# Patient Record
Sex: MALE | Race: WHITE | NOT HISPANIC OR LATINO | Employment: STUDENT | URBAN - METROPOLITAN AREA
[De-identification: names, ages, dates, MRNs, and addresses within clinical notes are randomized per-mention and may not be internally consistent; named-entity substitution may affect disease eponyms.]

---
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
## 2022-03-16 ENCOUNTER — OFFICE VISIT (OUTPATIENT)
Dept: FAMILY MEDICINE CLINIC | Facility: CLINIC | Age: 22
End: 2022-03-16

## 2022-03-16 VITALS
HEIGHT: 69 IN | RESPIRATION RATE: 14 BRPM | OXYGEN SATURATION: 100 % | HEART RATE: 99 BPM | BODY MASS INDEX: 17.57 KG/M2 | TEMPERATURE: 98.7 F

## 2022-03-16 DIAGNOSIS — R11.10 VOMITING, INTRACTABILITY OF VOMITING NOT SPECIFIED, PRESENCE OF NAUSEA NOT SPECIFIED, UNSPECIFIED VOMITING TYPE: ICD-10-CM

## 2022-03-16 DIAGNOSIS — R50.9 FEVER, UNSPECIFIED FEVER CAUSE: ICD-10-CM

## 2022-03-16 DIAGNOSIS — R52 BODY ACHES: Primary | ICD-10-CM

## 2022-03-16 LAB
EXPIRATION DATE: NORMAL
FLUAV AG NPH QL: NEGATIVE
FLUBV AG NPH QL: NEGATIVE
INTERNAL CONTROL: NORMAL
Lab: NORMAL

## 2022-03-16 PROCEDURE — 99213 OFFICE O/P EST LOW 20 MIN: CPT | Performed by: PHYSICIAN ASSISTANT

## 2022-03-16 PROCEDURE — 87804 INFLUENZA ASSAY W/OPTIC: CPT | Performed by: PHYSICIAN ASSISTANT

## 2022-03-16 NOTE — PROGRESS NOTES
"Chief Complaint  Vomiting, Fever, and Generalized Body Aches    Subjective          Crow Anton presents to [facility]  History of Present Illness  Crow is a 21 year old male who presents with one episode of vomiting, low-grade fever, and body aches.  States he had sudden onset on Monday evening March 14, 2022.  States he later found out that his girlfriend's parents had the flu.  He was around them over the weekend.  Has not had flu or Covid immunization.  Denied any current headaches, fatigue, chills, cough, wheezing, shortness of air, sore throat, sinus pressure, rhinorrhea, ear pain, fever, diarrhea, or loss of taste or smell.  His appetite and sleep have been normal for him.  States he eats multiple meals throughout the day due to his high metabolism.  Temperature has been running around 98.6-99.5.  Has been using Tylenol and ibuprofen without relief of body aches.  Review of Systems   Constitutional: Positive for fever.   Gastrointestinal: Positive for vomiting.   Musculoskeletal: Positive for myalgias.   All other systems reviewed and are negative.    Objective   Vital Signs:   Pulse 99   Temp 98.7 °F (37.1 °C)   Resp 14   Ht 175.3 cm (69\")   SpO2 100%   BMI 17.57 kg/m²     Physical Exam  Vitals and nursing note reviewed.   Constitutional:       Appearance: Normal appearance. He is well-developed and well-groomed.      Interventions: Face mask in place.   HENT:      Head: Normocephalic and atraumatic.      Jaw: There is normal jaw occlusion.      Right Ear: Hearing, tympanic membrane, ear canal and external ear normal.      Left Ear: Hearing, tympanic membrane, ear canal and external ear normal.      Nose: Nose normal.      Right Sinus: No maxillary sinus tenderness or frontal sinus tenderness.      Left Sinus: No maxillary sinus tenderness or frontal sinus tenderness.      Mouth/Throat:      Lips: Pink.      Mouth: Mucous membranes are moist.      Dentition: Normal " dentition.      Tongue: No lesions.      Pharynx: Oropharynx is clear. Uvula midline.      Tonsils: No tonsillar exudate.   Eyes:      General: Lids are normal.      Conjunctiva/sclera: Conjunctivae normal.      Pupils: Pupils are equal, round, and reactive to light.   Neck:      Trachea: Trachea and phonation normal. No tracheal tenderness.   Cardiovascular:      Rate and Rhythm: Normal rate and regular rhythm.      Pulses: Normal pulses.      Heart sounds: Normal heart sounds, S1 normal and S2 normal. No murmur heard.  Pulmonary:      Effort: Pulmonary effort is normal.      Breath sounds: Normal breath sounds and air entry.   Abdominal:      General: Bowel sounds are normal.      Palpations: Abdomen is soft.      Tenderness: There is no abdominal tenderness. There is no right CVA tenderness, left CVA tenderness, guarding or rebound. Negative signs include Valdes's sign, Rovsing's sign, McBurney's sign, psoas sign and obturator sign.   Musculoskeletal:      Cervical back: Neck supple.      Right lower leg: No edema.      Left lower leg: No edema.   Lymphadenopathy:      Cervical: No cervical adenopathy.      Right cervical: No superficial, deep or posterior cervical adenopathy.     Left cervical: No superficial, deep or posterior cervical adenopathy.   Skin:     General: Skin is warm and dry.      Capillary Refill: Capillary refill takes less than 2 seconds.   Neurological:      Mental Status: He is alert and oriented to person, place, and time.   Psychiatric:         Attention and Perception: Attention and perception normal.         Mood and Affect: Mood and affect normal.         Speech: Speech normal.         Behavior: Behavior is cooperative.         Thought Content: Thought content normal.         Cognition and Memory: Cognition and memory normal.         Judgment: Judgment normal.     Patient was wearing a mask when I enter room.  I enter the room wearing hair covering, face shield, N 95 mask and  gown/gloves.  Appropriate PPE was worn by medical assistant and myself during the office encounter.  I washed hands thoroughly after leaving the patient's room after removal of PPE.     Result Review :          Results for orders placed or performed in visit on 03/16/22   POC Influenza A / B    Specimen: Swab   Result Value Ref Range    Rapid Influenza A Ag Negative Negative    Rapid Influenza B Ag Negative Negative    Internal Control Passed Passed    Lot Number [id number]     Expiration Date 04/16/23                  Assessment and Plan    Diagnoses and all orders for this visit:    1. Body aches (Primary)  -     POC Influenza A / B  -     COVID-19,[facility] ROUTINE, NP/OP SWAB IN TRANSPORT MEDIA OR ESWAB 72 HR TAT - Swab, Nasopharynx  -     QUESTIONNAIRE SERIES    2. Fever, unspecified fever cause  -     POC Influenza A / B  -     COVID-19,[facility] ROUTINE, NP/OP SWAB IN TRANSPORT MEDIA OR ESWAB 72 HR TAT - Swab, Nasopharynx  -     QUESTIONNAIRE SERIES    3. Vomiting, intractability of vomiting not specified, presence of nausea not specified, unspecified vomiting type  -     POC Influenza A / B  -     COVID-19,[facility] ROUTINE, NP/OP SWAB IN TRANSPORT MEDIA OR ESWAB 72 HR TAT - Swab, Nasopharynx  -     QUESTIONNAIRE SERIES    Crow was seen in office today with new onset body aches,fevers,and vomiting  with flu exposure.  In office flu test was negative.  Have sent Covid test to laboratory for further evaluation.  Instructed to quarantine at home until test results are completed.  May use over-the-counter Tylenol or Advil as needed for body aches.  Increase fluids and rest.  Vomiting has since resolved.  Have provided a work note.  Crow will return to office for annual physical examination with Dr. Galdamez in the near future.    Follow Up   Return in about 3 months (around 6/6/2022) for Annual with Dr. Galdamez.  Patient was given instructions and counseling regarding his condition or for health maintenance advice. Please  see specific information pulled into the AVS if appropriate.     [facility] [place] [id number] [facility] [id number]  Dept: [phone]  Dept Fax: [phone]  Loc: [phone]  Loc Fax: [phone]

## 2022-03-17 LAB
LABCORP SARS-COV-2, NAA 2 DAY TAT: NORMAL
SARS-COV-2 RNA RESP QL NAA+PROBE: NOT DETECTED

## 2022-06-07 ENCOUNTER — OFFICE VISIT (OUTPATIENT)
Dept: FAMILY MEDICINE CLINIC | Facility: CLINIC | Age: 22
End: 2022-06-07

## 2022-06-07 VITALS
SYSTOLIC BLOOD PRESSURE: 100 MMHG | OXYGEN SATURATION: 95 % | HEIGHT: 69 IN | DIASTOLIC BLOOD PRESSURE: 60 MMHG | HEART RATE: 50 BPM | BODY MASS INDEX: 19.85 KG/M2 | TEMPERATURE: 97.7 F | WEIGHT: 134 LBS

## 2022-06-07 DIAGNOSIS — J30.1 SEASONAL ALLERGIC RHINITIS DUE TO POLLEN: ICD-10-CM

## 2022-06-07 DIAGNOSIS — Z00.01 ENCOUNTER FOR WELL ADULT EXAM WITH ABNORMAL FINDINGS: Primary | ICD-10-CM

## 2022-06-07 PROCEDURE — 2014F MENTAL STATUS ASSESS: CPT | Performed by: FAMILY MEDICINE

## 2022-06-07 PROCEDURE — 3008F BODY MASS INDEX DOCD: CPT | Performed by: FAMILY MEDICINE

## 2022-06-07 PROCEDURE — 99395 PREV VISIT EST AGE 18-39: CPT | Performed by: FAMILY MEDICINE

## 2022-06-07 RX ORDER — AZELASTINE 1 MG/ML
2 SPRAY, METERED NASAL 2 TIMES DAILY
Qty: 30 ML | Refills: 5 | Status: SHIPPED | OUTPATIENT
Start: 2022-06-07

## 2022-06-08 NOTE — PROGRESS NOTES
Subjective   Crow Anton is a 21 y.o. male with   Chief Complaint   Patient presents with   • Annual Exam   • Cough     ?allergies   .    History of Present Illness   21-year-old white male here for routine complete physical exam.  In general patient is healthy and has no significant medical history.  He does suffer from seasonal allergic rhinitis with allergies beginning in the spring and lasting throughout summer.  He has been using Claritin-D on a twice daily basis with some success.  He continues to have some postnasal drip with increased cough.  Cough is nonproductive and there has been no fever, chest pain or shortness of air.  He is otherwise doing well and has no acute complaints.  Fasting labs have not been performed prior to this visit.  The following portions of the patient's history were reviewed and updated as appropriate: allergies, current medications, past family history, past medical history, past social history, past surgical history and problem list.    Review of Systems   HENT: Positive for congestion and postnasal drip.    Respiratory: Positive for cough.    Allergic/Immunologic: Positive for environmental allergies.   All other systems reviewed and are negative.      Objective     Vitals:    06/07/22 1520   BP: 100/60   Pulse: 50   Temp: 97.7 °F (36.5 °C)   SpO2: 95%       No results found for this or any previous visit (from the past 672 hour(s)).    Physical Exam  Vitals and nursing note reviewed.   Constitutional:       General: He is not in acute distress.     Appearance: Normal appearance. He is well-developed and well-groomed.   HENT:      Head: Normocephalic and atraumatic.      Right Ear: Hearing, tympanic membrane, ear canal and external ear normal.      Left Ear: Hearing, tympanic membrane, ear canal and external ear normal.      Nose: Nose normal.      Mouth/Throat:      Lips: Pink.      Mouth: Mucous membranes are moist.      Dentition: Normal dentition.      Tongue: No lesions.  Tongue does not deviate from midline.      Palate: No mass and lesions.      Pharynx: Oropharynx is clear. Uvula midline.   Eyes:      General: Lids are normal. No scleral icterus.     Extraocular Movements: Extraocular movements intact.      Conjunctiva/sclera: Conjunctivae normal.      Pupils: Pupils are equal, round, and reactive to light.      Funduscopic exam:     Right eye: No hemorrhage, exudate, AV nicking or papilledema.         Left eye: No hemorrhage, exudate, AV nicking or papilledema.   Neck:      Thyroid: No thyroid mass or thyromegaly.      Vascular: No carotid bruit or JVD.      Trachea: Trachea normal.   Cardiovascular:      Rate and Rhythm: Normal rate and regular rhythm.      Chest Wall: PMI is not displaced.      Pulses: Normal pulses.           Carotid pulses are 2+ on the right side and 2+ on the left side.       Radial pulses are 2+ on the right side and 2+ on the left side.      Heart sounds: Normal heart sounds, S1 normal and S2 normal. No murmur heard.    No friction rub. No gallop.   Pulmonary:      Effort: Pulmonary effort is normal.      Breath sounds: Normal breath sounds. No decreased breath sounds, wheezing, rhonchi or rales.   Abdominal:      General: Abdomen is flat. Bowel sounds are normal. There is no distension.      Palpations: Abdomen is soft. Abdomen is not rigid. There is no hepatomegaly, splenomegaly or mass.      Tenderness: There is no abdominal tenderness. There is no right CVA tenderness, left CVA tenderness, guarding or rebound.      Hernia: No hernia is present.   Musculoskeletal:         General: No tenderness or deformity. Normal range of motion.      Cervical back: Normal range of motion and neck supple. No muscular tenderness. Normal range of motion.   Lymphadenopathy:      Cervical: No cervical adenopathy.   Skin:     General: Skin is warm and dry.      Findings: No rash.   Neurological:      Mental Status: He is alert and oriented to person, place, and time.       Cranial Nerves: Cranial nerves are intact. No cranial nerve deficit.      Sensory: Sensation is intact. No sensory deficit.      Motor: Motor function is intact. No tremor or abnormal muscle tone.      Coordination: Coordination is intact. Coordination normal.      Gait: Gait is intact. Gait normal.      Deep Tendon Reflexes: Reflexes are normal and symmetric. Reflexes normal.      Reflex Scores:       Bicep reflexes are 2+ on the right side and 2+ on the left side.       Brachioradialis reflexes are 2+ on the right side and 2+ on the left side.       Patellar reflexes are 2+ on the right side and 2+ on the left side.  Psychiatric:         Attention and Perception: Attention and perception normal.         Mood and Affect: Mood and affect normal.         Speech: Speech normal.         Behavior: Behavior normal. Behavior is cooperative.         Thought Content: Thought content normal.         Cognition and Memory: Cognition and memory normal.         Judgment: Judgment normal.         Assessment & Plan   Diagnoses and all orders for this visit:    1. Encounter for well adult exam with abnormal findings (Primary)  -     Comprehensive metabolic panel; Future  -     Vitamin D 25 hydroxy; Future  -     TSH; Future  -     Lipid panel; Future  -     CBC w AUTO Differential; Future    2. Seasonal allergic rhinitis due to pollen  -     azelastine (ASTELIN) 0.1 % nasal spray; 2 sprays into the nostril(s) as directed by provider 2 (Two) Times a Day. Use in each nostril as directed  Dispense: 30 mL; Refill: 5  -     Comprehensive metabolic panel; Future  -     Vitamin D 25 hydroxy; Future  -     TSH; Future  -     Lipid panel; Future  -     CBC w AUTO Differential; Future    Patient will continue Claritin-D and add Astelin nasal spray.  He has also been encouraged to acquire Flonase nasal spray over-the-counter and add this to the regimen.  If this all improves his status but has not reached his goal he will contact this office  for further therapy.  He has been encouraged to wear seatbelt while driving and a bicycle helmet while riding a bicycle.    Return in about 1 year (around 6/7/2023), or if symptoms worsen or fail to improve, for Recheck.

## 2022-10-19 ENCOUNTER — TELEPHONE (OUTPATIENT)
Dept: FAMILY MEDICINE CLINIC | Facility: CLINIC | Age: 22
End: 2022-10-19

## 2022-10-19 NOTE — TELEPHONE ENCOUNTER
Pt called stating that he tested positive this morning to COVID and he is unsure of the precautions he is supposed to take.  How long to stay off work and does he need to wear a mask for a certain amount of days when he returns to work.

## 2023-06-08 ENCOUNTER — OFFICE VISIT (OUTPATIENT)
Dept: FAMILY MEDICINE CLINIC | Facility: CLINIC | Age: 23
End: 2023-06-08
Payer: COMMERCIAL

## 2023-06-08 VITALS
SYSTOLIC BLOOD PRESSURE: 110 MMHG | WEIGHT: 140 LBS | TEMPERATURE: 97.8 F | HEART RATE: 97 BPM | HEIGHT: 69 IN | DIASTOLIC BLOOD PRESSURE: 76 MMHG | OXYGEN SATURATION: 96 % | BODY MASS INDEX: 20.73 KG/M2

## 2023-06-08 DIAGNOSIS — Z00.01 ENCOUNTER FOR WELL ADULT EXAM WITH ABNORMAL FINDINGS: Primary | ICD-10-CM

## 2023-06-08 DIAGNOSIS — J30.1 SEASONAL ALLERGIC RHINITIS DUE TO POLLEN: ICD-10-CM

## 2023-06-09 PROBLEM — J30.1 SEASONAL ALLERGIC RHINITIS DUE TO POLLEN: Status: ACTIVE | Noted: 2023-06-09

## 2023-06-09 NOTE — PROGRESS NOTES
Subjective   Crow Anton is a 22 y.o. male with   Chief Complaint   Patient presents with    Annual Exam   .    History of Present Illness   22-year-old white male here for routine complete physical exam.  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  He does have history of seasonal allergic rhinitis and has used Astelin nasal spray in the past.  He is otherwise on no prescriptive medication and past medical history is rather sparse.  Fasting labs were not obtained prior to this visit.  The following portions of the patient's history were reviewed and updated as appropriate: allergies, current medications, past family history, past medical history, past social history, past surgical history and problem list.    Review of Systems   Allergic/Immunologic: Positive for environmental allergies.   All other systems reviewed and are negative.    Objective     Vitals:    06/08/23 1528   BP: 110/76   Pulse: 97   Temp: 97.8 °F (36.6 °C)   SpO2: 96%       No results found for this or any previous visit (from the past 672 hour(s)).    Physical Exam  Vitals and nursing note reviewed.   Constitutional:       General: He is not in acute distress.     Appearance: Normal appearance. He is well-developed and well-groomed.   HENT:      Head: Normocephalic and atraumatic.      Right Ear: Hearing, tympanic membrane, ear canal and external ear normal.      Left Ear: Hearing, tympanic membrane, ear canal and external ear normal.      Nose: Nose normal.      Mouth/Throat:      Lips: Pink.      Mouth: Mucous membranes are moist.      Dentition: Normal dentition.      Tongue: No lesions. Tongue does not deviate from midline.      Palate: No mass and lesions.      Pharynx: Oropharynx is clear. Uvula midline.   Eyes:      General: Lids are normal. No scleral icterus.     Extraocular Movements: Extraocular movements intact.      Conjunctiva/sclera:  Conjunctivae normal.      Pupils: Pupils are equal, round, and reactive to light.      Funduscopic exam:     Right eye: No hemorrhage, exudate, AV nicking or papilledema.         Left eye: No hemorrhage, exudate, AV nicking or papilledema.   Neck:      Thyroid: No thyroid mass or thyromegaly.      Vascular: No carotid bruit or JVD.      Trachea: Trachea normal.   Cardiovascular:      Rate and Rhythm: Normal rate and regular rhythm.      Chest Wall: PMI is not displaced.      Pulses: Normal pulses.           Carotid pulses are 2+ on the right side and 2+ on the left side.       Radial pulses are 2+ on the right side and 2+ on the left side.      Heart sounds: Normal heart sounds, S1 normal and S2 normal. No murmur heard.    No friction rub. No gallop.   Pulmonary:      Effort: Pulmonary effort is normal.      Breath sounds: Normal breath sounds. No decreased breath sounds, wheezing, rhonchi or rales.   Abdominal:      General: Abdomen is flat. Bowel sounds are normal. There is no distension.      Palpations: Abdomen is soft. Abdomen is not rigid. There is no hepatomegaly, splenomegaly or mass.      Tenderness: There is no abdominal tenderness. There is no right CVA tenderness, left CVA tenderness, guarding or rebound.      Hernia: No hernia is present.   Musculoskeletal:         General: No tenderness or deformity. Normal range of motion.      Cervical back: Normal range of motion and neck supple. No muscular tenderness. Normal range of motion.   Lymphadenopathy:      Cervical: No cervical adenopathy.   Skin:     General: Skin is warm and dry.      Findings: No rash.   Neurological:      Mental Status: He is alert and oriented to person, place, and time.      Cranial Nerves: No cranial nerve deficit.      Sensory: Sensation is intact. No sensory deficit.      Motor: Motor function is intact. No tremor or abnormal muscle tone.      Coordination: Coordination is intact. Coordination normal.      Gait: Gait is intact.  Gait normal.      Deep Tendon Reflexes: Reflexes are normal and symmetric. Reflexes normal.      Reflex Scores:       Bicep reflexes are 2+ on the right side and 2+ on the left side.       Brachioradialis reflexes are 2+ on the right side and 2+ on the left side.       Patellar reflexes are 2+ on the right side and 2+ on the left side.  Psychiatric:         Attention and Perception: Attention and perception normal.         Mood and Affect: Mood and affect normal.         Speech: Speech normal.         Behavior: Behavior normal. Behavior is cooperative.         Thought Content: Thought content normal.         Cognition and Memory: Cognition and memory normal.         Judgment: Judgment normal.       Assessment & Plan   Diagnoses and all orders for this visit:    1. Encounter for well adult exam with abnormal findings (Primary)  -     Lipid panel; Future  -     TSH; Future  -     Comprehensive metabolic panel; Future  -     Vitamin D 25 hydroxy; Future  -     CBC w AUTO Differential; Future    2. Seasonal allergic rhinitis due to pollen  -     Lipid panel; Future  -     TSH; Future  -     Comprehensive metabolic panel; Future  -     Vitamin D 25 hydroxy; Future  -     CBC w AUTO Differential; Future    Patient has been advised to wear seatbelts while driving and a bicycle helmet while riding a bicycle.    Return in about 1 year (around 6/8/2024) for Recheck.

## 2023-06-12 DIAGNOSIS — J30.1 SEASONAL ALLERGIC RHINITIS DUE TO POLLEN: ICD-10-CM

## 2023-06-12 DIAGNOSIS — Z00.01 ENCOUNTER FOR WELL ADULT EXAM WITH ABNORMAL FINDINGS: ICD-10-CM

## 2023-06-14 LAB
25(OH)D3+25(OH)D2 SERPL-MCNC: 16.4 NG/ML (ref 30–100)
ALBUMIN SERPL-MCNC: 4.8 G/DL (ref 3.5–5.2)
ALBUMIN/GLOB SERPL: 2.1 G/DL
ALP SERPL-CCNC: 68 U/L (ref 39–117)
ALT SERPL-CCNC: 33 U/L (ref 1–41)
AST SERPL-CCNC: 16 U/L (ref 1–40)
BASOPHILS # BLD AUTO: 0.06 10*3/MM3 (ref 0–0.2)
BASOPHILS NFR BLD AUTO: 0.5 % (ref 0–1.5)
BILIRUB SERPL-MCNC: 0.5 MG/DL (ref 0–1.2)
BUN SERPL-MCNC: 8 MG/DL (ref 6–20)
BUN/CREAT SERPL: 7.6 (ref 7–25)
CALCIUM SERPL-MCNC: 10.1 MG/DL (ref 8.6–10.5)
CHLORIDE SERPL-SCNC: 103 MMOL/L (ref 98–107)
CHOLEST SERPL-MCNC: 145 MG/DL (ref 0–200)
CO2 SERPL-SCNC: 28.4 MMOL/L (ref 22–29)
CREAT SERPL-MCNC: 1.05 MG/DL (ref 0.76–1.27)
EGFRCR SERPLBLD CKD-EPI 2021: 103.6 ML/MIN/1.73
EOSINOPHIL # BLD AUTO: 0.22 10*3/MM3 (ref 0–0.4)
EOSINOPHIL NFR BLD AUTO: 1.9 % (ref 0.3–6.2)
ERYTHROCYTE [DISTWIDTH] IN BLOOD BY AUTOMATED COUNT: 12.1 % (ref 12.3–15.4)
GLOBULIN SER CALC-MCNC: 2.3 GM/DL
GLUCOSE SERPL-MCNC: 94 MG/DL (ref 65–99)
HCT VFR BLD AUTO: 43.4 % (ref 37.5–51)
HDLC SERPL-MCNC: 43 MG/DL (ref 40–60)
HGB BLD-MCNC: 14.8 G/DL (ref 13–17.7)
IMM GRANULOCYTES # BLD AUTO: 0.03 10*3/MM3 (ref 0–0.05)
IMM GRANULOCYTES NFR BLD AUTO: 0.3 % (ref 0–0.5)
LDLC SERPL CALC-MCNC: 90 MG/DL (ref 0–100)
LYMPHOCYTES # BLD AUTO: 2.66 10*3/MM3 (ref 0.7–3.1)
LYMPHOCYTES NFR BLD AUTO: 22.7 % (ref 19.6–45.3)
MCH RBC QN AUTO: 30.6 PG (ref 26.6–33)
MCHC RBC AUTO-ENTMCNC: 34.1 G/DL (ref 31.5–35.7)
MCV RBC AUTO: 89.9 FL (ref 79–97)
MONOCYTES # BLD AUTO: 0.91 10*3/MM3 (ref 0.1–0.9)
MONOCYTES NFR BLD AUTO: 7.8 % (ref 5–12)
NEUTROPHILS # BLD AUTO: 7.85 10*3/MM3 (ref 1.7–7)
NEUTROPHILS NFR BLD AUTO: 66.8 % (ref 42.7–76)
NRBC BLD AUTO-RTO: 0 /100 WBC (ref 0–0.2)
PLATELET # BLD AUTO: 290 10*3/MM3 (ref 140–450)
POTASSIUM SERPL-SCNC: 4.1 MMOL/L (ref 3.5–5.2)
PROT SERPL-MCNC: 7.1 G/DL (ref 6–8.5)
RBC # BLD AUTO: 4.83 10*6/MM3 (ref 4.14–5.8)
SODIUM SERPL-SCNC: 141 MMOL/L (ref 136–145)
TRIGL SERPL-MCNC: 60 MG/DL (ref 0–150)
TSH SERPL DL<=0.005 MIU/L-ACNC: 2.26 UIU/ML (ref 0.27–4.2)
VLDLC SERPL CALC-MCNC: 12 MG/DL (ref 5–40)
WBC # BLD AUTO: 11.73 10*3/MM3 (ref 3.4–10.8)

## 2025-06-02 ENCOUNTER — HOSPITAL ENCOUNTER (EMERGENCY)
Facility: HOSPITAL | Age: 25
Discharge: HOME | End: 2025-06-02
Attending: EMERGENCY MEDICINE | Admitting: EMERGENCY MEDICINE

## 2025-06-02 ENCOUNTER — APPOINTMENT (OUTPATIENT)
Dept: CT IMAGING | Facility: HOSPITAL | Age: 25
End: 2025-06-02

## 2025-06-02 VITALS
OXYGEN SATURATION: 98 % | RESPIRATION RATE: 16 BRPM | WEIGHT: 125 LBS | HEART RATE: 72 BPM | TEMPERATURE: 97.9 F | BODY MASS INDEX: 18.51 KG/M2 | HEIGHT: 69 IN | DIASTOLIC BLOOD PRESSURE: 72 MMHG | SYSTOLIC BLOOD PRESSURE: 116 MMHG

## 2025-06-02 DIAGNOSIS — R51.9 FRONTAL HEADACHE: Primary | ICD-10-CM

## 2025-06-02 PROCEDURE — 99284 EMERGENCY DEPT VISIT MOD MDM: CPT | Performed by: EMERGENCY MEDICINE

## 2025-06-02 PROCEDURE — 70450 CT HEAD/BRAIN W/O DYE: CPT

## 2025-06-02 RX ORDER — ACETAMINOPHEN 500 MG
1000 TABLET ORAL ONCE
Status: COMPLETED | OUTPATIENT
Start: 2025-06-02 | End: 2025-06-02

## 2025-06-02 RX ORDER — DOXYCYCLINE 100 MG/1
100 CAPSULE ORAL DAILY
COMMUNITY

## 2025-06-02 RX ADMIN — ACETAMINOPHEN 1000 MG: 500 TABLET ORAL at 20:46

## 2025-06-02 NOTE — Clinical Note
[facility] EMERGENCY DEPARTMENT  [address]  [place] [address]  Phone: [phone]    Crow Anton was seen and treated in our emergency department on 6/2/2025.  He may return to work on 06/05/2025.         Thank you for choosing [facility].    Mario Mcgregor MD

## 2025-06-03 NOTE — DISCHARGE INSTRUCTIONS
Take Tylenol as needed as directed for pain.  Call Umer Angelo in the morning for follow-up appointment within 1 week.  Rest.  Return to the emergency department there is increased pain, numbness, tingling, weakness, change in bladder or bowel function, fever, chills, worse in any way at all.

## 2025-06-03 NOTE — ED PROVIDER NOTES
Subjective   History of Present Illness    Chief complaint: Headache    Location: Frontal    Quality/Severity: 5/10, gradual onset    Timing/Onset/Duration: Today    Modifying Factors: Patient took Benadryl with minimal relief    Associated Symptoms: No fever chills or cough.  No sore throat earache or nasal congestion.  No chest pain or shortness of breath.  No abdominal pain.  No diarrhea or burning when he urinates.  No nausea or vomiting.    Narrative: This 24-year-old presents with frontal headache.  Patient states he has been under a lot of stress.  Patient denies trauma.  No numbness, tingling, weakness, or change in bladder or bowel function.    PCP:Umer Galdamez DO      Review of Systems   Constitutional:  Negative for chills and fever.   HENT:  Negative for congestion, ear pain and sore throat.    Respiratory:  Negative for cough and shortness of breath.    Cardiovascular:  Negative for chest pain.   Gastrointestinal:  Negative for abdominal pain, diarrhea, nausea and vomiting.   Genitourinary:  Negative for difficulty urinating.   Musculoskeletal:  Negative for back pain.   Skin:  Negative for rash.   Neurological:  Positive for headaches. Negative for weakness and numbness.       Past Medical History:   Diagnosis Date    ADHD (attention deficit hyperactivity disorder)        Allergies   Allergen Reactions    Amoxicillin Anaphylaxis    Penicillins Anaphylaxis       No past surgical history on file.    No family history on file.    Social History     Socioeconomic History    Marital status: Single   Tobacco Use    Smoking status: Every Day     Current packs/day: 0.25     Average packs/day: 0.3 packs/day for 3.0 years (0.8 ttl pk-yrs)     Types: Cigarettes    Smokeless tobacco: Never   Vaping Use    Vaping status: Never Used   Substance and Sexual Activity    Alcohol use: No    Drug use: No    Sexual activity: Defer           Objective   Physical Exam  Vitals (The temperature is 97.9 °F, pulse 86,  respirations 18, /78, room air pulse ox 97%.) and nursing note reviewed.   Constitutional:       Appearance: Normal appearance.   HENT:      Head: Normocephalic and atraumatic.      Right Ear: Tympanic membrane normal.      Left Ear: Tympanic membrane normal.      Nose: Nose normal.      Mouth/Throat:      Mouth: Mucous membranes are moist.   Eyes:      Extraocular Movements: Extraocular movements intact.      Pupils: Pupils are equal, round, and reactive to light.   Cardiovascular:      Rate and Rhythm: Normal rate and regular rhythm.      Pulses: Normal pulses.      Heart sounds: Normal heart sounds. No murmur heard.     No friction rub. No gallop.   Pulmonary:      Effort: Pulmonary effort is normal.      Breath sounds: Normal breath sounds.   Abdominal:      General: Abdomen is flat. Bowel sounds are normal. There is no distension.      Palpations: Abdomen is soft. There is no mass.      Tenderness: There is no abdominal tenderness. There is no right CVA tenderness, left CVA tenderness, guarding or rebound.      Hernia: No hernia is present.   Musculoskeletal:         General: No swelling or tenderness. Normal range of motion.      Cervical back: Normal range of motion and neck supple. No rigidity or tenderness.   Skin:     General: Skin is warm and dry.      Findings: No rash.   Neurological:      General: No focal deficit present.      Mental Status: He is alert and oriented to person, place, and time.      Cranial Nerves: No cranial nerve deficit.      Sensory: No sensory deficit.      Motor: No weakness.         Procedures           ED Course      20:38 EDT, 06/02/25:  The risk versus the benefits of obtaining a CAT scan were discussed with the patient.  The patient is requesting a CAT scan to evaluate his headache.    22:07 EDT, 06/02/25:  The patient was reassessed.  He has no new complaints.  His vital signs reviewed and are stable.  Exam: Const alert and oriented x 4 with no focal deficits noted.   He has no meningeal signs.    22:07 EDT, 06/02/25:  Patient's diagnosis of headache was discussed with him.  The patient should take Motrin or Tylenol as needed as directed for pain.  The patient should call Dr. Umer Angelo in the morning for follow-up appointment within 1 week.  The patient should return to the Emergency Department if there is increased pain, numbness, tingling, weakness, change in bladder or bowel function, fever, chills, worsening way at all.  All the patient questions were answered he will be discharged in good condition.                                               MDM    Final diagnoses:   Frontal headache       ED Disposition  ED Disposition       None            No follow-up provider specified.       Medication List      No changes were made to your prescriptions during this visit.       No orders to display     Labs Reviewed - No data to display  No results found.    Final diagnoses:   None         ED Medications:  Medications - No data to display    New Medications:     Medication List        ASK your doctor about these medications      azelastine 0.1 % nasal spray  Commonly known as: ASTELIN  2 sprays into the nostril(s) as directed by provider 2 (Two) Times a Day. Use in each nostril as directed              Stopped Medications:     Medication List        ASK your doctor about these medications      azelastine 0.1 % nasal spray  Commonly known as: ASTELIN  2 sprays into the nostril(s) as directed by provider 2 (Two) Times a Day. Use in each nostril as directed                   Mario Mcgregor MD  06/02/25 3214

## 2025-06-04 ENCOUNTER — HOSPITAL ENCOUNTER (EMERGENCY)
Facility: HOSPITAL | Age: 25
Discharge: HOME | End: 2025-06-04
Attending: EMERGENCY MEDICINE | Admitting: EMERGENCY MEDICINE

## 2025-06-04 VITALS
WEIGHT: 121 LBS | HEIGHT: 69 IN | BODY MASS INDEX: 17.92 KG/M2 | SYSTOLIC BLOOD PRESSURE: 128 MMHG | HEART RATE: 92 BPM | RESPIRATION RATE: 16 BRPM | TEMPERATURE: 98.4 F | OXYGEN SATURATION: 98 % | DIASTOLIC BLOOD PRESSURE: 78 MMHG

## 2025-06-04 DIAGNOSIS — J02.9 PHARYNGITIS, UNSPECIFIED ETIOLOGY: Primary | ICD-10-CM

## 2025-06-04 DIAGNOSIS — J01.10 ACUTE NON-RECURRENT FRONTAL SINUSITIS: ICD-10-CM

## 2025-06-04 LAB — S PYO AG THROAT QL: NEGATIVE

## 2025-06-04 PROCEDURE — 99283 EMERGENCY DEPT VISIT LOW MDM: CPT | Performed by: EMERGENCY MEDICINE

## 2025-06-04 PROCEDURE — 87081 CULTURE SCREEN ONLY: CPT | Performed by: EMERGENCY MEDICINE

## 2025-06-04 PROCEDURE — 87880 STREP A ASSAY W/OPTIC: CPT | Performed by: EMERGENCY MEDICINE

## 2025-06-04 NOTE — ED PROVIDER NOTES
Subjective   History of Present Illness  Patient presents complaining of a 7-day history of sore throat.  Patient says he was exposed to someone that had strep.  Patient had some doxycycline leftover so has been taking that.  He finished it but says his throat still bothering him.  Patient is also complaining of sinus congestion, forehead pressure, and nasal congestion.  No therapy taken prior to arrival.  Nothing seems to make it better or worse.  Patient denies any vision changes, near syncope, weakness in his arms or legs or sensory changes in his arms or legs.  Patient is otherwise at his baseline health.  No recent travel, bad food exposure, or trauma.  No recent exposures to abnormal animals or chemicals.      Review of Systems   All other systems reviewed and are negative.      Past Medical History:   Diagnosis Date    ADHD (attention deficit hyperactivity disorder)        Allergies   Allergen Reactions    Amoxicillin Anaphylaxis    Penicillins Anaphylaxis       No past surgical history on file.    No family history on file.    Social History     Socioeconomic History    Marital status: Single   Tobacco Use    Smoking status: Every Day     Current packs/day: 0.25     Average packs/day: 0.3 packs/day for 3.0 years (0.8 ttl pk-yrs)     Types: Cigarettes    Smokeless tobacco: Never   Vaping Use    Vaping status: Never Used   Substance and Sexual Activity    Alcohol use: No    Drug use: No    Sexual activity: Defer           Objective   Physical Exam  Vitals and nursing note reviewed.   HENT:      Head: Normocephalic.      Mouth/Throat:      Mouth: Mucous membranes are moist. No oral lesions.      Pharynx: Oropharynx is clear. No pharyngeal swelling, oropharyngeal exudate or posterior oropharyngeal erythema.   Eyes:      Conjunctiva/sclera: Conjunctivae normal.   Pulmonary:      Effort: Pulmonary effort is normal.   Musculoskeletal:      Cervical back: Neck supple.      Right lower leg: No edema.      Left lower  leg: No edema.   Lymphadenopathy:      Cervical: No cervical adenopathy.   Skin:     General: Skin is warm and dry.      Capillary Refill: Capillary refill takes 2 to 3 seconds.   Neurological:      Mental Status: He is alert and oriented to person, place, and time.   Psychiatric:         Mood and Affect: Mood normal.         Behavior: Behavior normal.         Procedures           ED Course                                                       Medical Decision Making  Ddx upper respiratory infection, sinusitis, rhinitis, pharyngitis, allergies    Labs Reviewed  RAPID STREP A SCREEN - Normal  BETA HEMOLYTIC STREP CULTURE, THROAT    0955 Pt seen again prior to d/c.  Labs reviewed and are unremarkable.   All questions personally answered at the bedside and all d/c instructions personally reviewed with pt.  Discussed the importance of close outpt. f/u and pt. understands this and agrees to do so.  Pt agrees to return to ED immediately for any new, persistent, or worsening symptoms.    EMR Dragon/Transcription disclaimer:  Much of this encounter note is an electronic transcription/translation of spoken language to printed text using the Dragon Dictation System       Problems Addressed:  Acute non-recurrent frontal sinusitis: acute illness or injury  Pharyngitis, unspecified etiology: acute illness or injury        Final diagnoses:   Pharyngitis, unspecified etiology   Acute non-recurrent frontal sinusitis       ED Disposition  ED Disposition       ED Disposition   Discharge    Condition   Stable    Comment   --               [place] [id number] [facility] [place] [address] [phone]  In 3 days  If symptoms worsen         Medication List      No changes were made to your prescriptions during this visit.            Mike Kurtz MD  06/04/25 3776

## 2025-06-04 NOTE — DISCHARGE INSTRUCTIONS
You can take natural things that are over-the-counter such as vitamin C, zinc, doris, and magnesium all to help your immune system as well as to reduce any inflammation.

## 2025-06-06 LAB — BACTERIA SPEC AEROBE CULT: NORMAL

## 2025-06-11 ENCOUNTER — APPOINTMENT (OUTPATIENT)
Dept: CT IMAGING | Facility: HOSPITAL | Age: 25
End: 2025-06-11

## 2025-06-11 ENCOUNTER — HOSPITAL ENCOUNTER (EMERGENCY)
Facility: HOSPITAL | Age: 25
Discharge: HOME | End: 2025-06-11
Attending: STUDENT IN AN ORGANIZED HEALTH CARE EDUCATION/TRAINING PROGRAM | Admitting: STUDENT IN AN ORGANIZED HEALTH CARE EDUCATION/TRAINING PROGRAM

## 2025-06-11 VITALS
DIASTOLIC BLOOD PRESSURE: 80 MMHG | HEIGHT: 69 IN | TEMPERATURE: 97.8 F | SYSTOLIC BLOOD PRESSURE: 113 MMHG | WEIGHT: 121.03 LBS | BODY MASS INDEX: 17.93 KG/M2 | HEART RATE: 85 BPM | RESPIRATION RATE: 18 BRPM | OXYGEN SATURATION: 97 %

## 2025-06-11 DIAGNOSIS — R10.9 FLANK PAIN: Primary | ICD-10-CM

## 2025-06-11 LAB
ALBUMIN SERPL-MCNC: 4.5 G/DL (ref 3.5–5.2)
ALBUMIN/GLOB SERPL: 1.7 G/DL
ALP SERPL-CCNC: 50 U/L (ref 39–117)
ALT SERPL W P-5'-P-CCNC: 17 U/L (ref 1–41)
ANION GAP SERPL CALCULATED.3IONS-SCNC: 11.7 MMOL/L (ref 5–15)
AST SERPL-CCNC: 24 U/L (ref 1–40)
BASOPHILS # BLD AUTO: 0.05 10*3/MM3 (ref 0–0.2)
BASOPHILS NFR BLD AUTO: 0.5 % (ref 0–1.5)
BILIRUB SERPL-MCNC: 0.4 MG/DL (ref 0–1.2)
BILIRUB UR QL STRIP: NEGATIVE
BUN SERPL-MCNC: 14.6 MG/DL (ref 6–20)
BUN/CREAT SERPL: 14.7 (ref 7–25)
CALCIUM SPEC-SCNC: 9.6 MG/DL (ref 8.6–10.5)
CHLORIDE SERPL-SCNC: 102 MMOL/L (ref 98–107)
CLARITY UR: CLEAR
CO2 SERPL-SCNC: 23.3 MMOL/L (ref 22–29)
COLOR UR: YELLOW
CREAT SERPL-MCNC: 0.99 MG/DL (ref 0.76–1.27)
DEPRECATED RDW RBC AUTO: 39.8 FL (ref 37–54)
EGFRCR SERPLBLD CKD-EPI 2021: 109.8 ML/MIN/1.73
EOSINOPHIL # BLD AUTO: 0.11 10*3/MM3 (ref 0–0.4)
EOSINOPHIL NFR BLD AUTO: 1 % (ref 0.3–6.2)
ERYTHROCYTE [DISTWIDTH] IN BLOOD BY AUTOMATED COUNT: 12 % (ref 12.3–15.4)
GLOBULIN UR ELPH-MCNC: 2.7 GM/DL
GLUCOSE SERPL-MCNC: 96 MG/DL (ref 65–99)
GLUCOSE UR STRIP-MCNC: NEGATIVE MG/DL
HCT VFR BLD AUTO: 42.8 % (ref 37.5–51)
HGB BLD-MCNC: 14.3 G/DL (ref 13–17.7)
HGB UR QL STRIP.AUTO: NEGATIVE
IMM GRANULOCYTES # BLD AUTO: 0.03 10*3/MM3 (ref 0–0.05)
IMM GRANULOCYTES NFR BLD AUTO: 0.3 % (ref 0–0.5)
KETONES UR QL STRIP: NEGATIVE
LEUKOCYTE ESTERASE UR QL STRIP.AUTO: NEGATIVE
LYMPHOCYTES # BLD AUTO: 4.38 10*3/MM3 (ref 0.7–3.1)
LYMPHOCYTES NFR BLD AUTO: 41.5 % (ref 19.6–45.3)
MCH RBC QN AUTO: 30.5 PG (ref 26.6–33)
MCHC RBC AUTO-ENTMCNC: 33.4 G/DL (ref 31.5–35.7)
MCV RBC AUTO: 91.3 FL (ref 79–97)
MONOCYTES # BLD AUTO: 0.77 10*3/MM3 (ref 0.1–0.9)
MONOCYTES NFR BLD AUTO: 7.3 % (ref 5–12)
NEUTROPHILS NFR BLD AUTO: 49.4 % (ref 42.7–76)
NEUTROPHILS NFR BLD AUTO: 5.21 10*3/MM3 (ref 1.7–7)
NITRITE UR QL STRIP: NEGATIVE
NRBC BLD AUTO-RTO: 0 /100 WBC (ref 0–0.2)
PH UR STRIP.AUTO: 6 [PH] (ref 4.5–8)
PLATELET # BLD AUTO: 272 10*3/MM3 (ref 140–450)
PMV BLD AUTO: 9.7 FL (ref 6–12)
POTASSIUM SERPL-SCNC: 4.4 MMOL/L (ref 3.5–5.2)
PROT SERPL-MCNC: 7.2 G/DL (ref 6–8.5)
PROT UR QL STRIP: NEGATIVE
RBC # BLD AUTO: 4.69 10*6/MM3 (ref 4.14–5.8)
SODIUM SERPL-SCNC: 137 MMOL/L (ref 136–145)
SP GR UR STRIP: 1.02 (ref 1–1.03)
UROBILINOGEN UR QL STRIP: NORMAL
WBC NRBC COR # BLD AUTO: 10.55 10*3/MM3 (ref 3.4–10.8)

## 2025-06-11 PROCEDURE — 96374 THER/PROPH/DIAG INJ IV PUSH: CPT

## 2025-06-11 PROCEDURE — 85025 COMPLETE CBC W/AUTO DIFF WBC: CPT | Performed by: STUDENT IN AN ORGANIZED HEALTH CARE EDUCATION/TRAINING PROGRAM

## 2025-06-11 PROCEDURE — 74176 CT ABD & PELVIS W/O CONTRAST: CPT

## 2025-06-11 PROCEDURE — 25010000002 KETOROLAC TROMETHAMINE PER 15 MG: Performed by: STUDENT IN AN ORGANIZED HEALTH CARE EDUCATION/TRAINING PROGRAM

## 2025-06-11 PROCEDURE — 80053 COMPREHEN METABOLIC PANEL: CPT | Performed by: STUDENT IN AN ORGANIZED HEALTH CARE EDUCATION/TRAINING PROGRAM

## 2025-06-11 PROCEDURE — 81003 URINALYSIS AUTO W/O SCOPE: CPT | Performed by: STUDENT IN AN ORGANIZED HEALTH CARE EDUCATION/TRAINING PROGRAM

## 2025-06-11 PROCEDURE — 99284 EMERGENCY DEPT VISIT MOD MDM: CPT | Performed by: STUDENT IN AN ORGANIZED HEALTH CARE EDUCATION/TRAINING PROGRAM

## 2025-06-11 RX ORDER — MUPIROCIN 20 MG/G
1 OINTMENT TOPICAL 3 TIMES DAILY
Qty: 30 G | Refills: 0 | Status: SHIPPED | OUTPATIENT
Start: 2025-06-11

## 2025-06-11 RX ORDER — KETOROLAC TROMETHAMINE 30 MG/ML
15 INJECTION, SOLUTION INTRAMUSCULAR; INTRAVENOUS ONCE
Status: COMPLETED | OUTPATIENT
Start: 2025-06-11 | End: 2025-06-11

## 2025-06-11 RX ADMIN — KETOROLAC TROMETHAMINE 15 MG: 30 INJECTION, SOLUTION INTRAMUSCULAR; INTRAVENOUS at 01:59

## 2025-06-11 NOTE — Clinical Note
[facility] EMERGENCY DEPARTMENT  [address]  [place] [address]  Phone: [phone]    Crow Anton was seen and treated in our emergency department on 6/11/2025.  He may return to work on 06/12/2025.         Thank you for choosing [facility].    Calvin Escamilla MD

## 2025-06-11 NOTE — ED PROVIDER NOTES
Subjective   History of Present Illness  Is a 24-year-old male with no significant past medical history other than ADHD and an everyday smoker present with complaint of scant right flank pain going into the right testicle has been present for 2 days.  States that is not sharp and stabbing but he looked up his symptoms because he folic he had a heartbeat in his right kidney and so he thought he had a kidney stone or a kidney aneurysm.  He states that he has no fever no chills he has not been confused he has no burning when he pees.  He was treated for STIs approximately a week ago.        Review of Systems    Past Medical History:   Diagnosis Date    ADHD (attention deficit hyperactivity disorder)        Allergies   Allergen Reactions    Amoxicillin Anaphylaxis    Penicillins Anaphylaxis       History reviewed. No pertinent surgical history.    History reviewed. No pertinent family history.    Social History     Socioeconomic History    Marital status: Single   Tobacco Use    Smoking status: Every Day     Current packs/day: 0.25     Average packs/day: 0.3 packs/day for 3.0 years (0.8 ttl pk-yrs)     Types: Cigarettes    Smokeless tobacco: Never   Vaping Use    Vaping status: Never Used   Substance and Sexual Activity    Alcohol use: No    Drug use: No    Sexual activity: Defer           Objective   Physical Exam  Vitals and nursing note reviewed. Exam conducted with a chaperone present.   Constitutional:       General: He is not in acute distress.     Appearance: Normal appearance. He is not ill-appearing, toxic-appearing or diaphoretic.   HENT:      Head: Normocephalic and atraumatic.      Nose: Nose normal.      Mouth/Throat:      Pharynx: No oropharyngeal exudate or posterior oropharyngeal erythema.   Eyes:      Extraocular Movements: Extraocular movements intact.      Conjunctiva/sclera: Conjunctivae normal.      Pupils: Pupils are equal, round, and reactive to light.   Cardiovascular:      Rate and Rhythm:  Normal rate and regular rhythm.   Pulmonary:      Effort: Pulmonary effort is normal. No respiratory distress.      Breath sounds: Normal breath sounds. No stridor. No wheezing, rhonchi or rales.   Abdominal:      General: Abdomen is flat. There is no distension.      Palpations: Abdomen is soft.      Tenderness: There is no abdominal tenderness. There is no right CVA tenderness, left CVA tenderness, guarding or rebound.   Musculoskeletal:         General: No swelling, tenderness, deformity or signs of injury. Normal range of motion.      Cervical back: Normal range of motion. No rigidity or tenderness.      Right lower leg: No edema.      Left lower leg: No edema.   Skin:     General: Skin is warm and dry.      Capillary Refill: Capillary refill takes less than 2 seconds.      Findings: No erythema or rash.   Neurological:      General: No focal deficit present.      Mental Status: He is alert and oriented to person, place, and time. Mental status is at baseline.      Cranial Nerves: No cranial nerve deficit.      Sensory: No sensory deficit.      Motor: No weakness.   Psychiatric:         Mood and Affect: Mood normal.         Procedures           ED Course                                                       Medical Decision Making  Patient here for evaluation of a kidney stone due to right flank pain going into his testicle.  Will get a stone CT but I consider this extremely unlikely as patient is not in acute pain.  No other focal source of pain identified, no CVA tenderness concerning for pyelonephritis patient is otherwise well-appearing    Amount and/or Complexity of Data Reviewed  Labs: ordered.  Radiology: ordered.    Risk  Prescription drug management.        Final diagnoses:   Flank pain       ED Disposition  ED Disposition       ED Disposition   Discharge    Condition   Stable    Comment   --               [facility] EMERGENCY DEPARTMENT  [address]   [facility] [place]  [address]  [phone]  Go to   If symptoms worsen         Medication List      No changes were made to your prescriptions during this visit.            Calvin Escamilla MD  06/11/25 3425

## 2025-06-11 NOTE — Clinical Note
[facility] EMERGENCY DEPARTMENT  [address]  [place] [address]  Phone: [phone]    Crow Anton was seen and treated in our emergency department on 6/11/2025.  He may return to work on 06/12/2025.         Thank you for choosing [facility].    Calvin Escamilla MD